# Patient Record
Sex: FEMALE | Race: BLACK OR AFRICAN AMERICAN | NOT HISPANIC OR LATINO | URBAN - METROPOLITAN AREA
[De-identification: names, ages, dates, MRNs, and addresses within clinical notes are randomized per-mention and may not be internally consistent; named-entity substitution may affect disease eponyms.]

---

## 2022-11-16 ENCOUNTER — OFFICE VISIT (OUTPATIENT)
Dept: URBAN - METROPOLITAN AREA CLINIC 111 | Facility: CLINIC | Age: 27
End: 2022-11-16
Payer: COMMERCIAL

## 2022-11-16 ENCOUNTER — DASHBOARD ENCOUNTERS (OUTPATIENT)
Age: 27
End: 2022-11-16

## 2022-11-16 VITALS
DIASTOLIC BLOOD PRESSURE: 66 MMHG | BODY MASS INDEX: 21.99 KG/M2 | TEMPERATURE: 97.9 F | HEIGHT: 65 IN | WEIGHT: 132 LBS | SYSTOLIC BLOOD PRESSURE: 100 MMHG | HEART RATE: 81 BPM

## 2022-11-16 DIAGNOSIS — R10.13 DYSPEPSIA: ICD-10-CM

## 2022-11-16 PROCEDURE — 99242 OFF/OP CONSLTJ NEW/EST SF 20: CPT | Performed by: NURSE PRACTITIONER

## 2022-11-16 NOTE — HPI-TODAY'S VISIT:
28 yo female patient was referred by CLAY Sandoval. A copy of this document will be sent to the referring provider. Reports going to the [facility] on 11/4/22 for epigastric discomfort, bloating and gurgling. Taking famotidine and drinking decaffeinated coffee with relief of symptoms since then. Reports similar episode in 2016 that was relieved by diet change.  Denies fever, chills, abd pain, dysphagia, nausea, vomiting, melena, hematochezia or weight loss. No fh gi malignancy.